# Patient Record
Sex: MALE | NOT HISPANIC OR LATINO | URBAN - METROPOLITAN AREA
[De-identification: names, ages, dates, MRNs, and addresses within clinical notes are randomized per-mention and may not be internally consistent; named-entity substitution may affect disease eponyms.]

---

## 2020-01-13 ENCOUNTER — APPOINTMENT (RX ONLY)
Dept: URBAN - METROPOLITAN AREA CLINIC 48 | Facility: CLINIC | Age: 50
Setting detail: DERMATOLOGY
End: 2020-01-13

## 2020-01-13 DIAGNOSIS — L259 CONTACT DERMATITIS AND OTHER ECZEMA, UNSPECIFIED CAUSE: ICD-10-CM | Status: INADEQUATELY CONTROLLED

## 2020-01-13 PROBLEM — L23.9 ALLERGIC CONTACT DERMATITIS, UNSPECIFIED CAUSE: Status: ACTIVE | Noted: 2020-01-13

## 2020-01-13 PROCEDURE — 99202 OFFICE O/P NEW SF 15 MIN: CPT

## 2020-01-13 PROCEDURE — ? COUNSELING

## 2020-01-13 PROCEDURE — ? PRESCRIPTION

## 2020-01-13 PROCEDURE — ? TREATMENT REGIMEN

## 2020-01-13 RX ORDER — PROTECTIVES COMBINATION NO.2
CREAM (GRAM) TOPICAL
Qty: 1 | Refills: 6 | Status: ERX | COMMUNITY
Start: 2020-01-13

## 2020-01-13 RX ADMIN — Medication: at 00:00

## 2020-01-13 ASSESSMENT — LOCATION ZONE DERM: LOCATION ZONE: HAND

## 2020-01-13 ASSESSMENT — LOCATION DETAILED DESCRIPTION DERM
LOCATION DETAILED: LEFT DORSAL RING METACARPOPHALANGEAL JOINT
LOCATION DETAILED: RIGHT DORSAL MIDDLE METACARPOPHALANGEAL JOINT

## 2020-01-13 ASSESSMENT — LOCATION SIMPLE DESCRIPTION DERM
LOCATION SIMPLE: LEFT HAND
LOCATION SIMPLE: RIGHT HAND

## 2020-01-13 ASSESSMENT — PAIN INTENSITY VAS: HOW INTENSE IS YOUR PAIN 0 BEING NO PAIN, 10 BEING THE MOST SEVERE PAIN POSSIBLE?: NO PAIN

## 2020-01-13 NOTE — PROCEDURE: COUNSELING
Detail Level: Zone
Patient Specific Counseling (Will Not Stick From Patient To Patient): greater than 30 minutes spent counseling patient

## 2020-01-13 NOTE — PROCEDURE: TREATMENT REGIMEN
Plan: Recommended allergy patch testing- we will fax over records to [facility] once he has an appt\\nWe will try Tetrix- [facility] if tetrix can’t be covered
Detail Level: Simple
Initiate Treatment: Fluocinonide BID (pt has it at home from his primary)\\nTetrix QAM and during lunchtime\\nGentle soaps (vanicream) during the day instead of his regular soap
Samples Given: Vanicream soap bars

## 2020-01-13 NOTE — HPI: RASH
What Type Of Note Output Would You Prefer (Optional)?: Standard Output
Is This A New Presentation, Or A Follow-Up?: Rash
Additional History: It’s worse in the morning and night. He uses miracle hand in the morning and triamcinolone at night. A1C has always been high so likes to avoid steroids. Itching is primarily on the fourth and fifth fingers. Patient reports that the flare started in April and hasn’t been clear since but it got significantly worse when he had his shingles vaccine. He also has a history of latex allergy and possible allergy to a barrier at his daughters ice rink that seemed to be moldy which he was in contact with when it started. Patient reports itching with cera ve, dove, and many other products that are typically meant for sensitive skin.